# Patient Record
Sex: MALE | URBAN - METROPOLITAN AREA
[De-identification: names, ages, dates, MRNs, and addresses within clinical notes are randomized per-mention and may not be internally consistent; named-entity substitution may affect disease eponyms.]

---

## 2018-05-30 ENCOUNTER — APPOINTMENT (OUTPATIENT)
Age: 38
Setting detail: DERMATOLOGY
End: 2018-05-31

## 2018-05-30 VITALS — HEIGHT: 70 IN | WEIGHT: 187 LBS

## 2018-05-30 DIAGNOSIS — L40.8 OTHER PSORIASIS: ICD-10-CM

## 2018-05-30 DIAGNOSIS — L21.8 OTHER SEBORRHEIC DERMATITIS: ICD-10-CM

## 2018-05-30 PROBLEM — E05.90 THYROTOXICOSIS, UNSPECIFIED WITHOUT THYROTOXIC CRISIS OR STORM: Status: ACTIVE | Noted: 2018-05-30

## 2018-05-30 PROBLEM — L29.8 OTHER PRURITUS: Status: ACTIVE | Noted: 2018-05-30

## 2018-05-30 PROBLEM — L70.0 ACNE VULGARIS: Status: ACTIVE | Noted: 2018-05-30

## 2018-05-30 PROBLEM — L85.3 XEROSIS CUTIS: Status: ACTIVE | Noted: 2018-05-30

## 2018-05-30 PROCEDURE — OTHER MIPS QUALITY: OTHER

## 2018-05-30 PROCEDURE — 99202 OFFICE O/P NEW SF 15 MIN: CPT

## 2018-05-30 PROCEDURE — OTHER COUNSELING: OTHER

## 2018-05-30 PROCEDURE — OTHER TREATMENT REGIMEN: OTHER

## 2018-05-30 PROCEDURE — OTHER FOLLOW UP FOR NEXT VISIT: OTHER

## 2018-05-30 ASSESSMENT — SEVERITY ASSESSMENT
HOW SEVERE IS THIS PATIENT'S CONDITION?: MILD
HOW SEVERE IS THIS PATIENT'S CONDITION?: MODERATE

## 2018-05-30 ASSESSMENT — LOCATION SIMPLE DESCRIPTION DERM
LOCATION SIMPLE: LEFT LIP
LOCATION SIMPLE: POSTERIOR SCALP
LOCATION SIMPLE: CHEST

## 2018-05-30 ASSESSMENT — LOCATION ZONE DERM
LOCATION ZONE: LIP
LOCATION ZONE: TRUNK
LOCATION ZONE: SCALP

## 2018-05-30 ASSESSMENT — LOCATION DETAILED DESCRIPTION DERM
LOCATION DETAILED: POSTERIOR MID-PARIETAL SCALP
LOCATION DETAILED: LEFT LOWER CUTANEOUS LIP
LOCATION DETAILED: LEFT MEDIAL INFERIOR CHEST

## 2018-05-30 NOTE — PROCEDURE: TREATMENT REGIMEN
Detail Level: Zone
Plan: Again patient will use the topical steroid once or twice daily to gain control and then the topical antifungal shampoo for maintenance. He will call or follow up with any problems or if he requires a prescription
Otc Regimen: Nizoral 1% shampoo
Plan: Hopefully by using the topical steroid twice daily for 2 to 4 weeks he will get this under control and then he can use the over-the-counter antifungal shampoo for maintenance. If not patient will call for further treatment recommendations. He can also call if he ultimately requires a prescription for a topical steroid for future use
Detail Level: Simple
Samples Given: Topicort 0.25% Spray

## 2018-05-30 NOTE — HPI: DRY SKIN
How Severe Is Your Dry Skin?: mild
Additional History: Patient with redness, scaling and flaking of the scalp on the posterior scalp primarily. He also has an area on the front of his chin that will get red, scaly, and irritated and an area on the mid chest with the same symptomatology. He has been dealing this for several years with no particular pattern. He has tried numerous over the counter medicated shampoos with some mild benefit
How Many Showers Or Baths Do You Take In One Day?: 1

## 2018-05-30 NOTE — PROCEDURE: MIPS QUALITY
Quality 110: Preventive Care And Screening: Influenza Immunization: Influenza Immunization Administered during Influenza season
Detail Level: Detailed
Quality 47: Advance Care Plan: Advance care planning not documented, reason not otherwise specified.